# Patient Record
Sex: MALE | Race: BLACK OR AFRICAN AMERICAN
[De-identification: names, ages, dates, MRNs, and addresses within clinical notes are randomized per-mention and may not be internally consistent; named-entity substitution may affect disease eponyms.]

---

## 2017-05-01 ENCOUNTER — HOSPITAL ENCOUNTER (EMERGENCY)
Dept: HOSPITAL 35 - ER | Age: 77
Discharge: HOME | End: 2017-05-01
Payer: COMMERCIAL

## 2017-05-01 VITALS — WEIGHT: 195 LBS | HEIGHT: 67 IN | BODY MASS INDEX: 30.61 KG/M2

## 2017-05-01 VITALS — SYSTOLIC BLOOD PRESSURE: 124 MMHG | DIASTOLIC BLOOD PRESSURE: 61 MMHG

## 2017-05-01 DIAGNOSIS — I10: ICD-10-CM

## 2017-05-01 DIAGNOSIS — E78.00: ICD-10-CM

## 2017-05-01 DIAGNOSIS — R55: Primary | ICD-10-CM

## 2017-05-01 DIAGNOSIS — Z95.5: ICD-10-CM

## 2017-05-01 DIAGNOSIS — K59.00: ICD-10-CM

## 2017-05-01 DIAGNOSIS — Z88.8: ICD-10-CM

## 2017-05-01 DIAGNOSIS — Z87.891: ICD-10-CM

## 2017-05-01 DIAGNOSIS — Z91.041: ICD-10-CM

## 2017-05-01 LAB
ALBUMIN SERPL-MCNC: 3.5 G/DL (ref 3.4–5)
ALP SERPL-CCNC: 87 U/L (ref 46–116)
ALT SERPL-CCNC: 31 U/L (ref 30–65)
ANION GAP SERPL CALC-SCNC: 10 MMOL/L (ref 7–16)
AST SERPL-CCNC: 24 U/L (ref 15–37)
BILIRUB SERPL-MCNC: 0.4 MG/DL
BILIRUB UR-MCNC: NEGATIVE MG/DL
BUN SERPL-MCNC: 16 MG/DL (ref 7–18)
CALCIUM SERPL-MCNC: 9.4 MG/DL (ref 8.5–10.1)
CHLORIDE SERPL-SCNC: 100 MMOL/L (ref 98–107)
CK-MB MASS: 1.4 NG/ML
CO2 SERPL-SCNC: 26 MMOL/L (ref 21–32)
COLOR UR: YELLOW
CREAT SERPL-MCNC: 1.5 MG/DL (ref 0.7–1.3)
EOSINOPHIL NFR BLD: 1 % (ref 0–3)
ERYTHROCYTE [DISTWIDTH] IN BLOOD BY AUTOMATED COUNT: 14.9 % (ref 10.5–14.5)
GLUCOSE SERPL-MCNC: 96 MG/DL (ref 74–106)
GRANULOCYTES NFR BLD MANUAL: 63 % (ref 36–66)
HCT VFR BLD CALC: 43.3 % (ref 42–52)
HGB BLD-MCNC: 14.4 GM/DL (ref 14–18)
KETONES UR STRIP-MCNC: NEGATIVE MG/DL
LYMPHOCYTES NFR BLD AUTO: 25 % (ref 24–44)
MAGNESIUM SERPL-MCNC: 1.8 MG/DL (ref 1.8–2.4)
MANUAL DIFFERENTIAL PERFORMED BLD QL: YES
MCH RBC QN AUTO: 27.8 PG (ref 26–34)
MCHC RBC AUTO-ENTMCNC: 33.2 G/DL (ref 28–37)
MCV RBC: 83.6 FL (ref 80–100)
MONOCYTES NFR BLD: 10 % (ref 1–8)
NEUTROPHILS # BLD: 3.9 THOU/UL (ref 1.4–8.2)
NEUTS BAND NFR BLD: 1 % (ref 0–8)
NT-PRO BRAIN NAT PEPTIDE: 40 PG/ML (ref ?–300)
PLATELET # BLD: 210 THOU/UL (ref 150–400)
POTASSIUM SERPL-SCNC: 3.7 MMOL/L (ref 3.5–5.1)
PROT SERPL-MCNC: 7.6 G/DL (ref 6.4–8.2)
RBC # BLD AUTO: 5.17 MIL/UL (ref 4.5–6)
RBC # UR STRIP: (no result) /UL
SODIUM SERPL-SCNC: 136 MMOL/L (ref 136–145)
SP GR UR STRIP: 1.01 (ref 1–1.03)
TOTAL CELL COUNT: 100
TROPONIN I SERPL-MCNC: < 0.04 NG/ML
URINE GLUCOSE-RANDOM*: NEGATIVE
URINE LEUKOCYTES-REFLEX: NEGATIVE
URINE PROTEIN (DIPSTICK): NEGATIVE
UROBILINOGEN UR STRIP-ACNC: 0.2 E.U./DL (ref 0.2–1)
WBC # BLD AUTO: 6.1 THOU/UL (ref 4–11)

## 2021-07-28 ENCOUNTER — HOSPITAL ENCOUNTER (INPATIENT)
Dept: HOSPITAL 35 - ER | Age: 81
LOS: 2 days | Discharge: HOME | DRG: 871 | End: 2021-07-30
Attending: INTERNAL MEDICINE | Admitting: INTERNAL MEDICINE
Payer: COMMERCIAL

## 2021-07-28 VITALS — SYSTOLIC BLOOD PRESSURE: 134 MMHG | DIASTOLIC BLOOD PRESSURE: 68 MMHG

## 2021-07-28 VITALS — DIASTOLIC BLOOD PRESSURE: 68 MMHG | SYSTOLIC BLOOD PRESSURE: 134 MMHG

## 2021-07-28 VITALS — BODY MASS INDEX: 30.37 KG/M2 | WEIGHT: 189 LBS | HEIGHT: 65.98 IN

## 2021-07-28 VITALS — SYSTOLIC BLOOD PRESSURE: 175 MMHG | DIASTOLIC BLOOD PRESSURE: 85 MMHG

## 2021-07-28 VITALS — SYSTOLIC BLOOD PRESSURE: 135 MMHG | DIASTOLIC BLOOD PRESSURE: 62 MMHG

## 2021-07-28 VITALS — SYSTOLIC BLOOD PRESSURE: 180 MMHG | DIASTOLIC BLOOD PRESSURE: 73 MMHG

## 2021-07-28 DIAGNOSIS — Z95.5: ICD-10-CM

## 2021-07-28 DIAGNOSIS — E78.5: ICD-10-CM

## 2021-07-28 DIAGNOSIS — Z91.041: ICD-10-CM

## 2021-07-28 DIAGNOSIS — Z87.891: ICD-10-CM

## 2021-07-28 DIAGNOSIS — A09: ICD-10-CM

## 2021-07-28 DIAGNOSIS — I25.10: ICD-10-CM

## 2021-07-28 DIAGNOSIS — Z88.8: ICD-10-CM

## 2021-07-28 DIAGNOSIS — Z86.010: ICD-10-CM

## 2021-07-28 DIAGNOSIS — N17.0: ICD-10-CM

## 2021-07-28 DIAGNOSIS — Z86.718: ICD-10-CM

## 2021-07-28 DIAGNOSIS — A41.9: Primary | ICD-10-CM

## 2021-07-28 DIAGNOSIS — I10: ICD-10-CM

## 2021-07-28 DIAGNOSIS — E78.00: ICD-10-CM

## 2021-07-28 DIAGNOSIS — K92.1: ICD-10-CM

## 2021-07-28 LAB
ALBUMIN SERPL-MCNC: 3.7 G/DL (ref 3.4–5)
ALT SERPL-CCNC: 30 U/L (ref 16–63)
ANION GAP SERPL CALC-SCNC: 11 MMOL/L (ref 7–16)
AST SERPL-CCNC: 35 U/L (ref 15–37)
BACTERIA-REFLEX: (no result) /HPF
BASOPHILS NFR BLD AUTO: 0.3 % (ref 0–2)
BILIRUB SERPL-MCNC: 0.7 MG/DL (ref 0.2–1)
BILIRUB UR-MCNC: NEGATIVE MG/DL
BUN SERPL-MCNC: 21 MG/DL (ref 7–18)
CALCIUM SERPL-MCNC: 9.4 MG/DL (ref 8.5–10.1)
CHLORIDE SERPL-SCNC: 101 MMOL/L (ref 98–107)
CO2 SERPL-SCNC: 22 MMOL/L (ref 21–32)
COLOR UR: YELLOW
CREAT SERPL-MCNC: 1.2 MG/DL (ref 0.7–1.3)
EOSINOPHIL NFR BLD: 0 % (ref 0–3)
ERYTHROCYTE [DISTWIDTH] IN BLOOD BY AUTOMATED COUNT: 15 % (ref 10.5–14.5)
GLUCOSE SERPL-MCNC: 145 MG/DL (ref 74–106)
GRANULOCYTES NFR BLD MANUAL: 91.5 % (ref 36–66)
HCT VFR BLD CALC: 41.4 % (ref 42–52)
HGB BLD-MCNC: 13.8 GM/DL (ref 14–18)
KETONES UR STRIP-MCNC: (no result) MG/DL
LIPASE: 87 U/L (ref 73–393)
LYMPHOCYTES NFR BLD AUTO: 4.4 % (ref 24–44)
MCH RBC QN AUTO: 28.9 PG (ref 26–34)
MCHC RBC AUTO-ENTMCNC: 33.2 G/DL (ref 28–37)
MCV RBC: 87.1 FL (ref 80–100)
MONOCYTES NFR BLD: 3.8 % (ref 1–8)
MUCUS: (no result) STRN/LPF
NEUTROPHILS # BLD: 13.1 THOU/UL (ref 1.4–8.2)
PLATELET # BLD: 209 THOU/UL (ref 150–400)
POTASSIUM SERPL-SCNC: 4.6 MMOL/L (ref 3.5–5.1)
PROT SERPL-MCNC: 8 G/DL (ref 6.4–8.2)
RBC # BLD AUTO: 4.76 MIL/UL (ref 4.5–6)
RBC # UR STRIP: (no result) /UL
RBC #/AREA URNS HPF: (no result) /HPF
SODIUM SERPL-SCNC: 134 MMOL/L (ref 136–145)
SP GR UR STRIP: 1.02 (ref 1–1.03)
SQUAMOUS: (no result) /LPF (ref 0–3)
URINE CLARITY: CLEAR
URINE GLUCOSE-RANDOM*: NEGATIVE
URINE LEUKOCYTES-REFLEX: NEGATIVE
URINE NITRITE-REFLEX: NEGATIVE
URINE PROTEIN (DIPSTICK): NEGATIVE
URINE WBC-REFLEX: (no result) /HPF (ref 0–5)
UROBILINOGEN UR STRIP-ACNC: 0.2 E.U./DL (ref 0.2–1)
WBC # BLD AUTO: 14.3 THOU/UL (ref 4–11)

## 2021-07-28 PROCEDURE — 10879: CPT

## 2021-07-29 VITALS — DIASTOLIC BLOOD PRESSURE: 70 MMHG | SYSTOLIC BLOOD PRESSURE: 136 MMHG

## 2021-07-29 VITALS — SYSTOLIC BLOOD PRESSURE: 152 MMHG | DIASTOLIC BLOOD PRESSURE: 78 MMHG

## 2021-07-29 VITALS — SYSTOLIC BLOOD PRESSURE: 154 MMHG | DIASTOLIC BLOOD PRESSURE: 67 MMHG

## 2021-07-29 VITALS — SYSTOLIC BLOOD PRESSURE: 147 MMHG | DIASTOLIC BLOOD PRESSURE: 55 MMHG

## 2021-07-29 LAB
ANION GAP SERPL CALC-SCNC: 11 MMOL/L (ref 7–16)
BUN SERPL-MCNC: 18 MG/DL (ref 7–18)
CALCIUM SERPL-MCNC: 8.7 MG/DL (ref 8.5–10.1)
CHLORIDE SERPL-SCNC: 107 MMOL/L (ref 98–107)
CO2 SERPL-SCNC: 22 MMOL/L (ref 21–32)
CREAT SERPL-MCNC: 1.5 MG/DL (ref 0.7–1.3)
ERYTHROCYTE [DISTWIDTH] IN BLOOD BY AUTOMATED COUNT: 15.1 % (ref 10.5–14.5)
GLUCOSE SERPL-MCNC: 101 MG/DL (ref 74–106)
HCT VFR BLD CALC: 38.2 % (ref 42–52)
HGB BLD-MCNC: 12.7 GM/DL (ref 14–18)
MCH RBC QN AUTO: 29 PG (ref 26–34)
MCHC RBC AUTO-ENTMCNC: 33.3 G/DL (ref 28–37)
MCV RBC: 87.1 FL (ref 80–100)
PLATELET # BLD: 150 THOU/UL (ref 150–400)
POTASSIUM SERPL-SCNC: 4 MMOL/L (ref 3.5–5.1)
RBC # BLD AUTO: 4.39 MIL/UL (ref 4.5–6)
SODIUM SERPL-SCNC: 140 MMOL/L (ref 136–145)
WBC # BLD AUTO: 12.4 THOU/UL (ref 4–11)

## 2021-07-29 NOTE — NUR
INITIAL ASSESSMENT:
[facility] reviewed chart and spoke with nursing and attending physician. Pt was
admitted from home due to colitis. GI consulted. Pt is on IV abx. Per chart,
pt has received the Moderna COVID vaccine. Discharge home is anticipated in
1-2 days. SW placed call to pt's room. No answer. Per chart, pt is
alert/orientated. Pt lives at home with his wife. Pt's PCP is Dr. Michelle Chacko. Plan is for pt to discharge home when medically stable. [facility] is
following to assist as needed with discharge planning.

## 2021-07-30 VITALS — DIASTOLIC BLOOD PRESSURE: 68 MMHG | SYSTOLIC BLOOD PRESSURE: 137 MMHG

## 2021-07-30 VITALS — SYSTOLIC BLOOD PRESSURE: 153 MMHG | DIASTOLIC BLOOD PRESSURE: 66 MMHG

## 2021-07-30 LAB
ANION GAP SERPL CALC-SCNC: 10 MMOL/L (ref 7–16)
BUN SERPL-MCNC: 16 MG/DL (ref 7–18)
CALCIUM SERPL-MCNC: 8.1 MG/DL (ref 8.5–10.1)
CHLORIDE SERPL-SCNC: 105 MMOL/L (ref 98–107)
CO2 SERPL-SCNC: 22 MMOL/L (ref 21–32)
CREAT SERPL-MCNC: 1.5 MG/DL (ref 0.7–1.3)
ERYTHROCYTE [DISTWIDTH] IN BLOOD BY AUTOMATED COUNT: 15.1 % (ref 10.5–14.5)
GLUCOSE SERPL-MCNC: 121 MG/DL (ref 74–106)
HCT VFR BLD CALC: 34.5 % (ref 42–52)
HGB BLD-MCNC: 11.4 GM/DL (ref 14–18)
MCH RBC QN AUTO: 28.8 PG (ref 26–34)
MCHC RBC AUTO-ENTMCNC: 33.2 G/DL (ref 28–37)
MCV RBC: 86.8 FL (ref 80–100)
PLATELET # BLD: 145 THOU/UL (ref 150–400)
POTASSIUM SERPL-SCNC: 3.8 MMOL/L (ref 3.5–5.1)
RBC # BLD AUTO: 3.98 MIL/UL (ref 4.5–6)
SODIUM SERPL-SCNC: 137 MMOL/L (ref 136–145)
WBC # BLD AUTO: 9.9 THOU/UL (ref 4–11)

## 2021-07-30 NOTE — NUR
DISCHARGE NOTE:
SW reviewed chart and spoke with nursing and attending physician. Pt's diet is
being advanced. Pt will most likely discharge home later today. SW placed call
to pt's room. No answer. No SW discharge needs identified at this time. Pt's
family will provide transportation home. SW is available to assist should
needs arise.

## 2021-07-30 NOTE — NUR
PT UP TO BATHROOM WITH SBA.  PT STATED HE HAD BM AND IT WAS CLEAR WITH NO
BLOOD OR REDNESS.  PT ANTICIPATING ON GOING HOME TODAY. VSS OVERNIGHT.  PT HAD
SOME BOUTS OF CHAD.  FULL LIQUID DIET.  HOURLY ROUNDING.

## 2021-07-30 NOTE — NUR
DISCHARGE NOTE: PT DISCHARGED AT 1545 VIA WIFE'S CAR TO HOME. NEW PRESCRIPTION
INFORMATION GIVEN TO PATIENT. PT SENT WITH PRESCRIPTIONS TO TAKE TO PHARMACY.
DISCHARGE EDUCATION PROVIDED AND ENCOURAGED PT TO ATTEND & SCHEDULE
FOLLOW UP APPOINTMENTS. IV & TELE MONITOR DISCONTINUED. DENIES ANY QUESTIONS.

## 2021-08-27 ENCOUNTER — HOSPITAL ENCOUNTER (OUTPATIENT)
Dept: HOSPITAL 35 - LAB | Age: 81
End: 2021-08-27
Payer: COMMERCIAL

## 2021-08-27 DIAGNOSIS — Z20.822: ICD-10-CM

## 2021-08-27 DIAGNOSIS — Z01.812: Primary | ICD-10-CM

## 2021-08-30 ENCOUNTER — HOSPITAL ENCOUNTER (OUTPATIENT)
Dept: HOSPITAL 35 - GI | Age: 81
Discharge: HOME | End: 2021-08-30
Attending: INTERNAL MEDICINE
Payer: COMMERCIAL

## 2021-08-30 VITALS — HEIGHT: 65.98 IN | BODY MASS INDEX: 29.57 KG/M2 | WEIGHT: 184 LBS

## 2021-08-30 DIAGNOSIS — G47.30: ICD-10-CM

## 2021-08-30 DIAGNOSIS — R93.3: Primary | ICD-10-CM

## 2021-08-30 DIAGNOSIS — Z87.891: ICD-10-CM

## 2021-08-30 DIAGNOSIS — E11.9: ICD-10-CM

## 2021-08-30 DIAGNOSIS — K64.8: ICD-10-CM

## 2021-08-30 DIAGNOSIS — Z86.010: ICD-10-CM

## 2021-08-30 DIAGNOSIS — K56.690: ICD-10-CM

## 2021-08-30 DIAGNOSIS — K63.3: ICD-10-CM

## 2021-08-30 DIAGNOSIS — K52.9: ICD-10-CM

## 2021-08-30 DIAGNOSIS — I50.9: ICD-10-CM

## 2021-08-30 DIAGNOSIS — Z79.01: ICD-10-CM

## 2021-08-30 DIAGNOSIS — E78.00: ICD-10-CM

## 2021-08-30 DIAGNOSIS — Z79.899: ICD-10-CM

## 2021-08-30 DIAGNOSIS — I25.10: ICD-10-CM

## 2021-08-30 DIAGNOSIS — Z98.890: ICD-10-CM

## 2021-08-30 DIAGNOSIS — Z86.718: ICD-10-CM

## 2021-08-30 DIAGNOSIS — H40.9: ICD-10-CM

## 2021-08-30 DIAGNOSIS — I11.0: ICD-10-CM

## 2021-08-30 PROCEDURE — 62900: CPT

## 2021-08-30 PROCEDURE — 62110: CPT

## 2021-09-01 NOTE — PATH
[facility]
[address]
[place]   [address]                     PATHOLOGY RPT PROCEDURE       
_______________________________________________________________________________
 
Name:       DINORAH HAMILTON               Room #:                     REG ALBERT RODARTE.#:      [id number]     Account #:      [id number]  
Admission:  08/30/21    Date of Birth:  [birth year]  
Discharge:                                              Report #:    [id number]
                                                        Path Case #: [id number]
_______________________________________________________________________________
 
L[facility] Accession Number: [id number]
.                                                                01
Material submitted:                                        .
colon - COLON MASS BIOPSY 65CM FROM ANAL VERGE. Modifiers: 65CM FROM ANAL
VERGE
.                                                                01
Clinical history:                                          .
DTS/COLONOSCOPY/COLITIS
COLITIS, COLON STRICTURE 65 CM FROM ANAL VERGE
.                                                                02
**********************************************************************
Diagnosis:
Large intestine, "colonic mass 65 cm from anal verge", endoscopic biopsy:
- Fragments of ulceration, granulation tissue and background large
intestine showing active colitis.
- Negative for dysplasia or malignancy.
(IUV:vinicio; 09/01/2021)
[facility]  09/01/2021  1311 Local
**********************************************************************
.                                                                02
Comment:
Examination shows patchy involvement of marked acute inflammation with a
few fragments showing ulceration, fibrinopurulent material, as well as
underlying granulation tissue without intact surface epithelium.  In
addition, other fragments show architecturally abnormal glands in a
fibrotic lamina propria.  Overall findings are suggestive of
diverticulitis, chronic active colitis including healing ischemic colitis,
as well as medication/drug induced colitis and infectious-type of colitis.
There is no epithelial dysplasia or malignancy identified within the
sampled fragments.  Please correlate clinically.
(IUV:vinicio; 09/01/2021)
.                                                                02
Electronically signed:                                     .
Susanna Lopez MD, Pathologist
NPI- [id number]
.                                                                01
Gross description:                                         .
The specimen is received in formalin, labeled "Dinorah Hamilton, colon
mass BX 65 cm from anal verge". Received are multiple segments of pale tan
tissue ranging in size from 0.3 to 0.5 cm in maximum dimensions. The
specimen is submitted entirely in cassette A1.([facility]; 8/31/2021)
[facility]  08/31/2021  1056 Local
.                                                                02
Pathologist provided ICD-10:
K63.3, K52.9
.                                                                02
CPT                                                        .
 
 
[address]   [address]                     PATHOLOGY RPT PROCEDURE       
_______________________________________________________________________________
 
Name:       DINORAH HAMILTON               Room #:                     REG ALBERT BADILLO#:      [id number]     Account #:      [id number]  
Admission:  08/30/21    Date of Birth:  [birth year]  
Discharge:                                              Report #:    [id number]
                                                        Path Case #: [id number]
_______________________________________________________________________________
[id number]
Specimen Comment: A courtesy copy of this report has been sent to [phone],
[phone]
Specimen Comment: [phone]
Specimen Comment: Report sent to  / DR PHELAN
***Performed at:  01
   [address], [place]  [id number]
   MD Raymond Hopper MD Phone:  [phone]
***Performed at:  02
   [address]  [id number]
   MD Susanna Lopez MD Phone:  [phone]

## 2021-09-20 ENCOUNTER — HOSPITAL ENCOUNTER (OUTPATIENT)
Dept: HOSPITAL 35 - CAT | Age: 81
End: 2021-09-20
Attending: INTERNAL MEDICINE
Payer: COMMERCIAL

## 2021-09-20 DIAGNOSIS — M41.86: ICD-10-CM

## 2021-09-20 DIAGNOSIS — M47.816: ICD-10-CM

## 2021-09-20 DIAGNOSIS — N28.1: ICD-10-CM

## 2021-09-20 DIAGNOSIS — K63.89: Primary | ICD-10-CM

## 2021-09-20 DIAGNOSIS — N20.0: ICD-10-CM

## 2021-09-20 DIAGNOSIS — J84.89: ICD-10-CM

## 2021-09-20 LAB
BUN SERPL-MCNC: 15 MG/DL (ref 7–18)
CREAT SERPL-MCNC: 1.4 MG/DL (ref 0.7–1.3)

## 2021-09-30 ENCOUNTER — HOSPITAL ENCOUNTER (OUTPATIENT)
Dept: HOSPITAL 35 - LAB | Age: 81
End: 2021-09-30
Payer: COMMERCIAL

## 2021-09-30 DIAGNOSIS — Z20.822: ICD-10-CM

## 2021-09-30 DIAGNOSIS — Z01.812: Primary | ICD-10-CM

## 2021-10-04 ENCOUNTER — HOSPITAL ENCOUNTER (OUTPATIENT)
Dept: HOSPITAL 35 - GI | Age: 81
Discharge: HOME | End: 2021-10-04
Attending: INTERNAL MEDICINE
Payer: COMMERCIAL

## 2021-10-04 VITALS — HEIGHT: 65.98 IN | WEIGHT: 181 LBS | BODY MASS INDEX: 29.09 KG/M2

## 2021-10-04 DIAGNOSIS — K63.3: ICD-10-CM

## 2021-10-04 DIAGNOSIS — K64.8: ICD-10-CM

## 2021-10-04 DIAGNOSIS — I10: ICD-10-CM

## 2021-10-04 DIAGNOSIS — Z98.41: ICD-10-CM

## 2021-10-04 DIAGNOSIS — Z98.42: ICD-10-CM

## 2021-10-04 DIAGNOSIS — K21.9: ICD-10-CM

## 2021-10-04 DIAGNOSIS — K56.699: Primary | ICD-10-CM

## 2021-10-04 DIAGNOSIS — Z98.890: ICD-10-CM

## 2021-10-04 DIAGNOSIS — I25.10: ICD-10-CM

## 2021-10-04 DIAGNOSIS — Z87.891: ICD-10-CM

## 2021-10-04 DIAGNOSIS — Z88.8: ICD-10-CM

## 2021-10-04 DIAGNOSIS — G47.30: ICD-10-CM

## 2021-10-04 DIAGNOSIS — Z91.041: ICD-10-CM

## 2021-10-04 DIAGNOSIS — Z79.899: ICD-10-CM

## 2021-10-04 DIAGNOSIS — E78.5: ICD-10-CM

## 2021-10-04 DIAGNOSIS — K62.1: ICD-10-CM

## 2021-10-04 DIAGNOSIS — H40.9: ICD-10-CM

## 2021-10-04 PROCEDURE — 62110: CPT

## 2021-10-04 PROCEDURE — 62900: CPT

## 2021-10-06 NOTE — PATH
[facility]
[address]
[place]   [address]                     PATHOLOGY RPT PROCEDURE       
_______________________________________________________________________________
 
Name:       DINORAH HAMILTON               Room #:                     REG ALBERT RODARTE.#:      [id number]     Account #:      [id number]  
Admission:  10/04/21    Date of Birth:  [birth year]  
Discharge:                                              Report #:    [id number]
                                                        Path Case #: [id number]
_______________________________________________________________________________
 
L[facility] Accession Number: [id number]
.                                                                01
Material submitted:                                        .
PART A: colon - COLONIC STRICTURE BIOPSY
PART B: rectum - RECTAL POLYP
.                                                                01
Clinical history:                                          .
COLONOSCOPY
COLONIC MASS
.                                                                02
**********************************************************************
Diagnosis:
A.  Colonic mucosa, colonic stricture, biopsy:
- Colonic mucosa with marked ulceration with fibrinopurulent exudate and
extensive granulation tissue and reactive changes present.
- There is no evidence of dysplasia or malignancy identified (see
comment).
.
B.  Rectal-type mucosa, rectal polyp, biopsy:
- Hyperplastic polyp.
.
(SCA:mml; 10/06/2021)
[facility]  10/06/2021  1122 Local
**********************************************************************
.                                                                02
Comment:
A.  Sections reveal colonic mucosa with marked ulceration with
fibrinopurulent exudate and granulation tissue.  No dysplasia or
malignancy is identified within the sections examined. The lesional tissue
of the mass may not have been sampled due to the abundance of
inflammation/ulceration and a repeat biopsy or excision is recommended
if clinically indicated.
.
(SCA:mml; 10/06/2021)
.                                                                02
Electronically signed:                                     .
Matt Easley DO, Pathologist
NPI- [id number]
.                                                                01
Gross description:                                         .
A.  The specimen is received in formalin, labeled "Dinorah Hamilton,
colonic stricture BX". Received are 4 segments of pale tan tissue ranging
in size from 0.2 to 0.5 cm in maximum dimensions. The specimen is
submitted entirely in cassette A1.
.
B.  The specimen is received in formalin, labeled "Dinorah Hamilton,
rectal polyp". Received is a segment of pale tan tissue measuring 0.4 cm
 
 
[place] [address]                     PATHOLOGY RPT PROCEDURE       
_______________________________________________________________________________
 
Name:       DINORAH HAMILTON               [facility]                     [facility]      [id number]     Account #:      [id number]  
Admission:  10/04/21    Date of Birth:  [birth year]  
Discharge:                                              Report #:    [id number]
                                                        Path Case #: [id number]
_______________________________________________________________________________
in maximum dimensions. The specimen is submitted entirely in cassette
B1.([facility]; 10/5/2021)
[facility]  10/05/2021  1523 Local
.                                                                02
Pathologist provided ICD-10:
K63.3, K62.1
.                                                                02
CPT                                                        .
001537, 788813
Specimen Comment: A courtesy copy of this report has been sent to [phone],
[phone]
Specimen Comment: [phone]
Specimen Comment: Report sent to  / DR PHELAN
***Performed at:  01
   [facility]
   [address] [place] [address]  [id number]
   MD Raymond Hopper MD Phone:  [phone]
***Performed at:  02
   [address]  [id number]
   MD Spencer Kerley MD Phone:  [phone]